# Patient Record
Sex: FEMALE | Race: ASIAN
[De-identification: names, ages, dates, MRNs, and addresses within clinical notes are randomized per-mention and may not be internally consistent; named-entity substitution may affect disease eponyms.]

---

## 2020-09-01 ENCOUNTER — APPOINTMENT (OUTPATIENT)
Dept: UROLOGY | Facility: CLINIC | Age: 75
End: 2020-09-01
Payer: MEDICARE

## 2020-09-01 VITALS
HEIGHT: 59.45 IN | BODY MASS INDEX: 24.07 KG/M2 | SYSTOLIC BLOOD PRESSURE: 130 MMHG | TEMPERATURE: 97.6 F | RESPIRATION RATE: 18 BRPM | OXYGEN SATURATION: 96 % | DIASTOLIC BLOOD PRESSURE: 70 MMHG | HEART RATE: 84 BPM | WEIGHT: 121 LBS

## 2020-09-01 DIAGNOSIS — Z78.9 OTHER SPECIFIED HEALTH STATUS: ICD-10-CM

## 2020-09-01 DIAGNOSIS — Z00.00 ENCOUNTER FOR GENERAL ADULT MEDICAL EXAMINATION W/OUT ABNORMAL FINDINGS: ICD-10-CM

## 2020-09-01 DIAGNOSIS — R31.0 GROSS HEMATURIA: ICD-10-CM

## 2020-09-01 PROCEDURE — 52000 CYSTOURETHROSCOPY: CPT

## 2020-09-01 PROCEDURE — 99204 OFFICE O/P NEW MOD 45 MIN: CPT | Mod: 25

## 2020-09-01 RX ORDER — CIPROFLOXACIN HYDROCHLORIDE 500 MG/1
500 TABLET, FILM COATED ORAL
Qty: 2 | Refills: 0 | Status: ACTIVE | COMMUNITY
Start: 2020-09-01

## 2020-09-01 RX ORDER — CIPROFLOXACIN HYDROCHLORIDE 500 MG/1
500 TABLET, FILM COATED ORAL
Refills: 0 | Status: COMPLETED | OUTPATIENT
Start: 2020-09-01

## 2020-09-01 NOTE — ADDENDUM
[FreeTextEntry1] : Entered by Bhanu Patten, acting as scribe for Dr. Henri Dickey.\par \par The documentation recorded by the scribe accurately reflects the service I personally performed and the decisions made by me.

## 2020-09-01 NOTE — LETTER BODY
[FreeTextEntry1] : Christiane Gates, NP\par [address]\[place] [address]\par Phone: [phone]\par \par Dear Ms. Gates,\par \par Reason for visit: Hematuria\par \par This is a 75 year-old Cantonese-speaking woman with gross hematuria referred for evaluation. She recently obtained a renal ultrasound on the 21st at [facility] demonstrating a right renal cyst measuring 3 cm. She denies any dysuria or urinary incontinence. The patient denies any aggravating or relieving factors. The patient denies any interference of function. The patient is currently asymptomatic. All other review of systems are negative. She has no cancer in her family medical history. She has no previous surgical history. Past medical history, family history and social history were inquired and were noncontributory to current condition. The patient does not use tobacco or drink alcohol. Medications and allergies were reviewed. She has no known allergies to medication. \par \par On examination, the patient is a healthy-appearing woman in no acute distress. She is alert and oriented follows commands. She  has normal mood and affect. She is normocephalic. Neck is supple. Oral no thrush. Respirations are unlabored. Abdomen is soft and nontender. Bladder is nonpalpable. No CVA tenderness. Neurologically she is grossly intact. No peripheral edema. Skin without gross abnormality.\par \par I personally reviewed ultrasound images with the patient today. Images demonstrated a right renal cyst measuring 3 cm.\par \par Assessment: Gross hematuria. Right renal cyst.\par \par I counseled the patient on the various etiologies of the gross hematuria. I discussed the risk of occult malignancy. I counseled the patient about gross hematuria. I recommended patient obtain urinalysis, urine cytology, and cystoscopy to further evaluate. I counseled the patient about the procedures. I answered the patient's questions. The risks and expected outcomes were discussed. The patient will follow up as directed and contact me with any questions or concerns. Thank you for the opportunity to participate in the care of Ms. REES. I will keep you updated on her progress.\par \par Plan: Urinalysis. Cytology. Cystoscopy. Follow up as directed. \par \par Cystoscopy today was unremarkable.

## 2020-09-04 LAB
APPEARANCE: CLEAR
BACTERIA: NEGATIVE
BILIRUBIN URINE: NEGATIVE
BLOOD URINE: NEGATIVE
COLOR: COLORLESS
GLUCOSE QUALITATIVE U: NEGATIVE
HYALINE CASTS: 1 /LPF
KETONES URINE: NEGATIVE
LEUKOCYTE ESTERASE URINE: NEGATIVE
MICROSCOPIC-UA: NORMAL
NITRITE URINE: NEGATIVE
PH URINE: 6
PROTEIN URINE: NEGATIVE
RED BLOOD CELLS URINE: 0 /HPF
SPECIFIC GRAVITY URINE: 1.01
SQUAMOUS EPITHELIAL CELLS: 0 /HPF
URINE CYTOLOGY: NORMAL
UROBILINOGEN URINE: NORMAL
WHITE BLOOD CELLS URINE: 0 /HPF